# Patient Record
(demographics unavailable — no encounter records)

---

## 2024-11-11 NOTE — HISTORY OF PRESENT ILLNESS
[FreeTextEntry1] : Mr. Ogden is a Male with a PMHx gout (dx 20016, reportedly by arthrocentesis; on colchicine and uloric), asthma, HTN, CAD, SORAYA, DM2, Afib, HLD, increased BMI, PVD who presents for follow up.   Last seen by Dr. Kellie Ac) at [facility] in 2021  Mr. Ogden is here for multiple reasons   INTERVAL HISTORY  has had some joint pain - feels like it is the gout - had some stress - the patient states his mother-in-law passed away last week at age 92 - he is currently ambulating with the use of a cane - there is pain in the patients left foot and ankle - he noticed there is pain and swelling in the right knee  ------------------------------------------------------  BACKGROUND / PRESENTATION  In terms of gout - dx 2016 with years of podagra, hyperuremia, metabolic syndrome and crystal confirmation in 2016.   DECT positive in the past as well. Allopurinol up to 400 mg without reaching therapeutic target despite medical compliance.  Uloric started with poor adherence and loss to f/u. Hasn't been on it for years and doesn't remember why.    Has been taking colchicine almost daily at this point for his joint symptoms - and a bit more with gout flares - which he continues to have often, the last one being last week.   Experiences pain and swelling in the ankles/toes/knees.  Associated with heat and redness.  took some extra colchicine and feels better this week   peak sUA 2017 9.4   CARE TEAM Dr. shields is PCP  Dr. Zacarias is Pulmonary Dr. Carranza - cardiology Dr. Green psychologist at [facility] (for his depression) had gout in the big toe - then the ankle and then the knee - feels better from that

## 2024-11-11 NOTE — ASSESSMENT
[FreeTextEntry1] : Mr. Ogden is a male with PMHx gout (dx 20016, reportedly by arthrocentesis; on colchicine and uloric), asthma, HTN, CAD, SORAYA, DM2, Afib, HLD, increased BMI, PVD who presents for follow up.   PCP = Dr. Surya Hull - [phone]  # gout  dx 2016 with years of podagra, hyperuricemia (peak sUA 2017 9.4), metabolic syndrome and crystal confirmation in 2016.   Dual energy CT showed gout crystals in left popliteal tendon. Allopurinol up to 400 mg without reaching therapeutic target despite medical compliance.  Uloric started with poor adherence and loss to f/u. Hasn't been on it for years and doesn't remember why.   -- check inflammatory markers and sUA - T2T - didn't do them yet -- continue colchicine at 3x week (on reduced dose 2/2 ranolazine) -- continue allopurinol at 100 mg - at target   # OA knees, tricompartmental -- tylenol prn  -- t/c steroid injection - declines for now  # CINDY positive  -- check TSH now and then annually with PCP  # medication monitoring  -- had increased pain uloric when didnt take the colhccine  -- continue colchicine  # barriers patient has difficulties with travel  -- also shared with patient Dr. Giraldo in [facility] medical care services serve as the continuing focal point for needed health care services that are part of ongoing care related to a patient's single, serious condition or a complex condition.   More than 50% of the encounter was spent counseling the patient on differential, workup, disease course and treatment/management. Education was provided to the patient during this encounter. All questions and concerns were addressed and answered. The patient verbalized understanding and agreed to the plan.   Patient has been instructed to call for an appointment if new symptoms develop. Patient has been instructed to make a follow-up appointment in 1 months   Time spent on the encounter included, but is not limited to, preparing to see the patient, obtaining and/or reviewing separately obtained history, performing the evaluation, counseling and educating, independently interpreting results with communication to patient, order placement, referring and/or communicating with other health professionals as described, and documenting clinical information in the electronic health record.

## 2024-12-04 NOTE — REVIEW OF SYSTEMS
[Fatigue] : fatigue [Cough] : cough [Fever] : no fever [Chills] : no chills [Chest Pain] : no chest pain [Lower Ext Edema] : no lower extremity edema [Shortness Of Breath] : no shortness of breath

## 2024-12-04 NOTE — ASSESSMENT
[FreeTextEntry1] : 81M with a history of hypertension, atrial fibrillation (Eliquis), type 2 diabetes, CAD (s/p PCIx3 May2022), SORAYA (not on CPAP), gout, and BPH presents for cough and follow up of chronic conditions.  RTC 10 weeks for f/u chronic conditions and TTE Case Discussed with Dr. Itzel Weiner MD EM PGY-4

## 2024-12-04 NOTE — HISTORY OF PRESENT ILLNESS
[de-identified] : 81M with a history of hypertension, atrial fibrillation (Eliquis), type 2 diabetes, CAD (s/p PCIx3 May2022), SORAYA (not on CPAP), gout, and BPH presents for follow up and 2 weeks of cough.   Cough: Pt reports 2 weeks ago he had worsening of congestion, cough, and fevers. Was very weak at that time. Fever and weakness have improved but cough remains and he does not feel rowena kto normal. in 9/2024 received COVID booster, Prevnar 20, RSV, Flu vaccine.  Pt also requests podiatry referral for help with his toenails, help increasing HHA hours, refill on adult diapers through home care agency, and to be considered to restart on Ozempic as he feels he has regained much weight in the last year since stopping the medication.

## 2024-12-04 NOTE — HISTORY OF PRESENT ILLNESS
[de-identified] : 81M with a history of hypertension, atrial fibrillation (Eliquis), type 2 diabetes, CAD (s/p PCIx3 May2022), SORAYA (not on CPAP), gout, and BPH presents for follow up and 2 weeks of cough.   Cough: Pt reports 2 weeks ago he had worsening of congestion, cough, and fevers. Was very weak at that time. Fever and weakness have improved but cough remains and he does not feel rowena kto normal. in 9/2024 received COVID booster, Prevnar 20, RSV, Flu vaccine.  Pt also requests podiatry referral for help with his toenails, help increasing HHA hours, refill on adult diapers through home care agency, and to be considered to restart on Ozempic as he feels he has regained much weight in the last year since stopping the medication.

## 2025-02-12 NOTE — HISTORY OF PRESENT ILLNESS
[de-identified] : 81M with a history of hypertension, atrial fibrillation (Eliquis), type 2 diabetes, CAD (s/p PCIx3 May2022), SORAYA (not on CPAP), gout, and BPH presents for follow up.  Chest Pains: occur in the AM associated with deep breathing exercises and bilateral shoulder stretches which also trigger pain. Non exertional, no pedal edema, no radiation of the pain. Saw Cardiologist Dr. Carranza, already scheduled for TTE and stress test.  B/l knee pain, lower back pain, R shoulder pain: Most painful in the morning, he feels stiff and like it takes time to get going. R shoulder worse with >90 degree abduction. back pain improves with walking but is worst when standing up from a chair. Does not feel he has acute gout pain. No fever, no weight loss, no small joint hand or foot or wrist or elbow pain.   Hematuria: intermittent hematuria with intermittent burning with urination, has occasional nonspecific abdominal pains, uncertain if they're related to the urinary symptoms. No fever, systemic symptoms, lightheadedness, bleeding from nose or rectum. Had cystoscopy many years ago without signs of mass.

## 2025-02-12 NOTE — REVIEW OF SYSTEMS
[Chest Pain] : chest pain [Abdominal Pain] : abdominal pain [Dysuria] : dysuria [Nocturia] : nocturia [Hematuria] : hematuria [Joint Pain] : joint pain [Back Pain] : back pain [Joint Swelling] : joint swelling [Fever] : no fever [Recent Change In Weight] : ~T no recent weight change [Palpitations] : no palpitations [Lower Ext Edema] : no lower extremity edema [Shortness Of Breath] : no shortness of breath [Dyspnea on Exertion] : not dyspnea on exertion [Incontinence] : no incontinence [Frequency] : no frequency

## 2025-02-12 NOTE — ASSESSMENT
[FreeTextEntry1] : 81M with a history of hypertension, atrial fibrillation (Eliquis), type 2 diabetes, CAD (s/p PCIx3 May2022), SORAYA (not on CPAP), gout, and BPH presents for follow up.  RTC 1 month f/u chronic issues Case Discussed with Dr. Chanda Weiner MD EM PGY-4

## 2025-02-12 NOTE — PHYSICAL EXAM
[Normal] : no acute distress, well nourished, well developed and well-appearing [No Respiratory Distress] : no respiratory distress  [No Accessory Muscle Use] : no accessory muscle use [No Edema] : there was no peripheral edema [No Spinal Tenderness] : no spinal tenderness [Coordination Grossly Intact] : coordination grossly intact [Speech Grossly Normal] : speech grossly normal [Memory Grossly Normal] : memory grossly normal [Normal Mood] : the mood was normal [de-identified] : L leg mildly tender varicosity to medial shin without erythema or rash. [de-identified] : limited ROm to the lumbar spine and back with tense firm musculature throughout, no spinous process tenderness. no warmth/redness to knee or hsoulder joints bilaterally. R shoulder with pain with passive and active ROM with abduction >90 degrees. No chest wall tenderness to the L chest.

## 2025-02-12 NOTE — PHYSICAL EXAM
[Normal] : no acute distress, well nourished, well developed and well-appearing [No Respiratory Distress] : no respiratory distress  [No Accessory Muscle Use] : no accessory muscle use [No Edema] : there was no peripheral edema [No Spinal Tenderness] : no spinal tenderness [Coordination Grossly Intact] : coordination grossly intact [Speech Grossly Normal] : speech grossly normal [Memory Grossly Normal] : memory grossly normal [Normal Mood] : the mood was normal [de-identified] : L leg mildly tender varicosity to medial shin without erythema or rash. [de-identified] : limited ROm to the lumbar spine and back with tense firm musculature throughout, no spinous process tenderness. no warmth/redness to knee or hsoulder joints bilaterally. R shoulder with pain with passive and active ROM with abduction >90 degrees. No chest wall tenderness to the L chest.

## 2025-02-12 NOTE — HISTORY OF PRESENT ILLNESS
[de-identified] : 81M with a history of hypertension, atrial fibrillation (Eliquis), type 2 diabetes, CAD (s/p PCIx3 May2022), SORAYA (not on CPAP), gout, and BPH presents for follow up.  Chest Pains: occur in the AM associated with deep breathing exercises and bilateral shoulder stretches which also trigger pain. Non exertional, no pedal edema, no radiation of the pain. Saw Cardiologist Dr. Carranza, already scheduled for TTE and stress test.  B/l knee pain, lower back pain, R shoulder pain: Most painful in the morning, he feels stiff and like it takes time to get going. R shoulder worse with >90 degree abduction. back pain improves with walking but is worst when standing up from a chair. Does not feel he has acute gout pain. No fever, no weight loss, no small joint hand or foot or wrist or elbow pain.   Hematuria: intermittent hematuria with intermittent burning with urination, has occasional nonspecific abdominal pains, uncertain if they're related to the urinary symptoms. No fever, systemic symptoms, lightheadedness, bleeding from nose or rectum. Had cystoscopy many years ago without signs of mass.

## 2025-02-20 NOTE — PHYSICAL EXAM
[No Acute Distress] : no acute distress [Normal Appearance] : normal appearance [Normal Rate/Rhythm] : normal rate/rhythm [Normal S1, S2] : normal s1, s2 [No Resp Distress] : no resp distress [Clear to Auscultation Bilaterally] : clear to auscultation bilaterally [No Edema] : no edema [TextBox_2] : Obese [TextBox_99] : Uses cane

## 2025-02-20 NOTE — REVIEW OF SYSTEMS
[Fatigue] : fatigue [SOB on Exertion] : sob on exertion [Arthralgias] : arthralgias [Back Pain] : back pain [Diabetes] : diabetes [Negative] : Hematologic [TextBox_44] : As in history of present illness

## 2025-02-20 NOTE — DISCUSSION/SUMMARY
[FreeTextEntry1] : Currently a great deal of his dyspnea is related to deconditioning.  He has difficulty walking and he is using a cane.  He clearly has weakness in both lower extremities particularly his quadriceps.  I asked him to follow-up with Dr. Sanders because of his right shoulder pain and I also gave him the name of a orthopedic surgeon.  I renewed his Breo and his nasal sprays.  I gave him a copy of his letter appealing the decision for reducing the hours of his aide

## 2025-02-20 NOTE — HISTORY OF PRESENT ILLNESS
[TextBox_4] : 82-year-old male complaining of dyspnea. The patient has a history of hypertension, diabetes, coronary artery disease, sleep apnea but not on treatment, gout and depression. He has had recent coronary stent placements. He is a lifelong non-smoker with no occupational exposures. Prior lung functions have demonstrated a mild to moderate restrictive ventilatory impairment. He had a significant multifocal pneumonia [id number] from which he has completely recovered.   He was last seen in 8/2024. At the time c/o dyspnea on minimal to moderate exertion with discomfort in both legs. Vascular evaluation of both lower extremities has been unremarkable. We have previously prescribed budesonide/formoterol as an inhaler but insurance coverage resulted in a change to Breo Ellipta. We also added nasal sprays.  He reports that he is using inhalers daily.  He clearly is quite deconditioned.  He had difficulty getting out of a chair and he is morbidly obese.  He is complaining today more about his right shoulder and shortness of breath.

## 2025-05-31 NOTE — ASSESSMENT
[FreeTextEntry1] : #HCM Lipids, CBC, CMP, Mg today, f/u Address HCM issues at next follow up  Case discussed with Dr. Avelino Garrett, PGY1

## 2025-05-31 NOTE — PHYSICAL EXAM
[No Acute Distress] : no acute distress [Well Nourished] : well nourished [Well Developed] : well developed [Normal Sclera/Conjunctiva] : normal sclera/conjunctiva [No Respiratory Distress] : no respiratory distress  [No Accessory Muscle Use] : no accessory muscle use [Clear to Auscultation] : lungs were clear to auscultation bilaterally [Normal Rate] : normal rate  [Regular Rhythm] : with a regular rhythm [Normal S1, S2] : normal S1 and S2 [No Murmur] : no murmur heard [No Extremity Clubbing/Cyanosis] : no extremity clubbing/cyanosis [de-identified] : some tearing of the eyes b/l, no injection or other discharge [de-identified] : 1+ lower extremity edema [de-identified] : lower extremity weakness, difficulty with standing up [de-identified] : dark discoloration in inguinal area consistent with tinea cruris

## 2025-05-31 NOTE — REVIEW OF SYSTEMS
[Chest Pain] : chest pain [Lower Ext Edema] : lower extremity edema [Dyspnea on Exertion] : dyspnea on exertion [Dysuria] : dysuria [Incontinence] : incontinence [Joint Pain] : joint pain [Back Pain] : back pain [Unsteady Walk] : ataxia [Memory Loss] : memory loss [Fever] : no fever [Chills] : no chills [Shortness Of Breath] : no shortness of breath [Abdominal Pain] : no abdominal pain [Nausea] : no nausea [Vomiting] : no vomiting

## 2025-05-31 NOTE — HISTORY OF PRESENT ILLNESS
[FreeTextEntry1] : f/u [de-identified] : 81 y/o M with hx of htn, afib on elliquis, T2D, CAD s/p PCI x 3 may 2022, SORAYA not on CPAP, gout and BPH presents for follow up on multiple chronic issues.   HTN: BPs at home are around 140. Pt reports he's only taking 1 losartan 25 mg tablet, though prescription is written as 2 tablets daily. Counseled pt on taking 2 tablets daily. Otherwise he is taking all medications as prescribed.   Low back/groin pain: ongoing for 2 years, feels like it got worse since the cardiac stent placement in 2023, during which the cardiologist went in through the groin for access. Pt asking for imaging. Has not been going to PT.  Weight gain: pt states he hasn't lost weight on Ozempic and wants to switch to monjaro. Pt amenable to titrate Ozempic to 2 mg first and see if that helps. Also counseled on healthy diet and going to PT.    Health aid: pt currently has aid for 7 hours/day M-T, but they recently cut back aid on Fridays to only 3 hours, pt asking for help to get aid for full 7 hours to help him prepare for weekend when there is no aid for two days.   Cardiac: Pt reports he has some chest pain and SOB with exertion and at rest intermittently, though this is his baseline. Counseled to d/c ranolazine given contraindication with colchicine, which pt is taking daily. Pt also taking isosorbide for his angina and states this works well.   Urinary incontinence: pt reports worsening recently, states he's having 3-4 daily episodes of urinary incontinence. Amenable to urology referral.

## 2025-05-31 NOTE — HISTORY OF PRESENT ILLNESS
[FreeTextEntry1] : f/u [de-identified] : 81 y/o M with hx of htn, afib on elliquis, T2D, CAD s/p PCI x 3 may 2022, SORAYA not on CPAP, gout and BPH presents for follow up on multiple chronic issues.   HTN: BPs at home are around 140. Pt reports he's only taking 1 losartan 25 mg tablet, though prescription is written as 2 tablets daily. Counseled pt on taking 2 tablets daily. Otherwise he is taking all medications as prescribed.   Low back/groin pain: ongoing for 2 years, feels like it got worse since the cardiac stent placement in 2023, during which the cardiologist went in through the groin for access. Pt asking for imaging. Has not been going to PT.  Weight gain: pt states he hasn't lost weight on Ozempic and wants to switch to monjaro. Pt amenable to titrate Ozempic to 2 mg first and see if that helps. Also counseled on healthy diet and going to PT.    Health aid: pt currently has aid for 7 hours/day M-T, but they recently cut back aid on Fridays to only 3 hours, pt asking for help to get aid for full 7 hours to help him prepare for weekend when there is no aid for two days.   Cardiac: Pt reports he has some chest pain and SOB with exertion and at rest intermittently, though this is his baseline. Counseled to d/c ranolazine given contraindication with colchicine, which pt is taking daily. Pt also taking isosorbide for his angina and states this works well.   Urinary incontinence: pt reports worsening recently, states he's having 3-4 daily episodes of urinary incontinence. Amenable to urology referral.

## 2025-05-31 NOTE — END OF VISIT
[] : Resident [FreeTextEntry3] : Pt here for f/u. Has alot of longstanding similar issues from previous visits. C/o LBP, bilat groin pain, weight gain, nocturia x 3-4, needs more HHA hours. On additional questioning, pt gets CP daily, only when he walks too fast and is rushing, although this seems to be everyday, and then he takes an isordil prn. Lives alone, eats Chinese food, other take out, one big meal/day, avoids meat and protein due to his gout, says he does he alot of vegetables. On exam, pt with cane, extreme difficulty just getting up from chair, and needs assistance to get onto exam table. Large abdominal pannus. Midline lower palpable back pain. Markedly decreased ROM of back in all directions. Bilat tenderness along inguinal ligaments, no masses or lesions found. Extensive hyperpigmented rash under scrotum involving perineum and upper thighs bilat, pt wearing a diaper and several layers of clothes.  Plan: 1. daily nitrates; 2. daily colchicine; 3,  referral; 4. PT referral; 5. discussed dietary changes/weight loss/increase Ozempic dose;, 6. adjust BP meds; 7. treatment for likely tinea corporis although with body habitus and pt wearing diapers unlikely to elimiate long-term; 8. concern for pt living alone with multiple co-morbidities, unclear how he can care for himself, SW to talk with pt today.

## 2025-05-31 NOTE — PHYSICAL EXAM
[No Acute Distress] : no acute distress [Well Nourished] : well nourished [Well Developed] : well developed [Normal Sclera/Conjunctiva] : normal sclera/conjunctiva [No Respiratory Distress] : no respiratory distress  [No Accessory Muscle Use] : no accessory muscle use [Clear to Auscultation] : lungs were clear to auscultation bilaterally [Normal Rate] : normal rate  [Regular Rhythm] : with a regular rhythm [Normal S1, S2] : normal S1 and S2 [No Murmur] : no murmur heard [No Extremity Clubbing/Cyanosis] : no extremity clubbing/cyanosis [de-identified] : some tearing of the eyes b/l, no injection or other discharge [de-identified] : 1+ lower extremity edema [de-identified] : lower extremity weakness, difficulty with standing up [de-identified] : dark discoloration in inguinal area consistent with tinea cruris

## 2025-07-16 NOTE — HISTORY OF PRESENT ILLNESS
[___ Day(s) Ago] : [unfilled] day(s) ago [FreeTextEntry1] : Mr. Ogden is a Male with a PMHx gout (dx 20016, reportedly by arthrocentesis; on colchicine and uloric), asthma, HTN, CAD, SORAYA, DM2, Afib, HLD, increased BMI, PVD who presents for follow up.   Last seen by Dr. Kellie Ac) at [facility] in 2021  Mr. Ogden is here for multiple reasons   INTERVAL HISTORY  has had some joint pain - feels like it is the gout - had some stress - the patient states his mother-in-law passed away last week at age 92 - he is currently ambulating with the use of a cane - there is pain in the patients left foot and ankle - he noticed there is pain and swelling in the right knee  ------------------------------------------------------  BACKGROUND / PRESENTATION  In terms of gout - dx 2016 with years of podagra, hyperuremia, metabolic syndrome and crystal confirmation in 2016.   DECT positive in the past as well. Allopurinol up to 400 mg without reaching therapeutic target despite medical compliance.  Uloric started with poor adherence and loss to f/u. Hasn't been on it for years and doesn't remember why.    Has been taking colchicine almost daily at this point for his joint symptoms - and a bit more with gout flares - which he continues to have often, the last one being last week.   Experiences pain and swelling in the ankles/toes/knees.  Associated with heat and redness.  took some extra colchicine and feels better this week   peak sUA 2017 9.4   CARE TEAM Dr. shields is PCP  Dr. Zacarias is Pulmonary Dr. Carranza - cardiology Dr. Green psychologist at [facility] (for his depression) had gout in the big toe - then the ankle and then the knee - feels better from that

## 2025-07-16 NOTE — ASSESSMENT
[FreeTextEntry1] : Mr. Ogden is a male with PMHx gout (dx 20016, reportedly by arthrocentesis; on colchicine and uloric), asthma, HTN, CAD, SORAYA, DM2, Afib, HLD, increased BMI, PVD who presents for follow up.   PCP = Dr. Surya Hull - [phone]  # gout  dx 2016 with years of podagra, hyperuricemia (peak sUA 2017 9.4), metabolic syndrome and crystal confirmation in 2016.   Dual energy CT showed gout crystals in left popliteal tendon. Allopurinol up to 400 mg without reaching therapeutic target despite medical compliance.  Uloric started with poor adherence and loss to f/u. Hasn't been on it for years and doesn't remember why.   -- check inflammatory markers and sUA - T2T - didn't do them yet -- continue colchicine at 3x week (on reduced dose 2/2 ranolazine) -- continue allopurinol at 100 mg - at target   # OA knees, tricompartmental -- tylenol prn  -- t/c steroid injection - declines for now  # CINDY positive  -- check TSH now and then annually with PCP  # medication monitoring  -- had increased pain uloric when didnt take the colhccine  -- continue colchicine  # barriers patient has difficulties with travel  -- also shared with patient Dr. Giraldo in [facility] medical care services serve as the continuing focal point for needed health care services that are part of ongoing care related to a patient's single, serious condition or a complex condition.   More than 50% of the encounter was spent counseling the patient on differential, workup, disease course and treatment/management. Education was provided to the patient during this encounter. All questions and concerns were addressed and answered. The patient verbalized understanding and agreed to the plan.   Patient has been instructed to call for an appointment if new symptoms develop. Patient has been instructed to make a follow-up appointment in 1 months   Time spent on the encounter included, but is not limited to, preparing to see the patient, obtaining and/or reviewing separately obtained history, performing the evaluation, counseling and educating, independently interpreting results with communication to patient, order placement, referring and/or communicating with other health professionals as described, and documenting clinical information in the electronic health record.

## 2025-07-17 NOTE — REASON FOR VISIT
[Symptom and Test Evaluation] : symptom and test evaluation [CV Risk Factors and Non-Cardiac Disease] : CV risk factors and non-cardiac disease [FreeTextEntry1] : Dear Ion:  I had the pleasure of re-evaluating Mr. Ogden for f/u cardiovascular and vascular medicine consultation.  I last saw him in July 2024.  PMH: This is an 81 year-old-man with history of HTN, DM2, CAD, SORAYA (not on CPAP), gout, BPH, and depression.  He is status post PCI x 2 times over 2 days with Dr. Lucia Welch ([place]). 5/31 -- first cath for  6/16 -- mLAD intervention   Since his last visit, he now complains of: 1. Progressively worsening exertional dyspnea.  His last cardiac assessment was in March 2023 via an echocardiogram that noted normal biventricular systolic function.  He had last undergone LHC in May 2022 at [place]. 2. Stable bilateral calf claudication.  His last ARTI/PVR was in October 2022 which was normal bilaterally.  He did report previously to his son (an interventional cardiologist in the [place]) that he had bilateral leg discomfort (non-exertional). He thought it might have been attributed to statin-induced myopathy as he was taking rosuvastatin 20 qhs at that time. He had stopped taking rosuvastatin for a month with improvement in symptoms. He has since resumed taking rosuvastatin 5 mg + Zetia 10 + coenzyme q10. He has no new leg complaints since then.  Recommendations/plan: 1. Continue current cardiac medications. 2. Will arrange for TTE and pharmacologic NST (he cannot ambulate). 3. F/U after testing to review results and updated recommendations. If stable. f/u in the office in six months. 4. He is also asking to maintain HHA for 8 hours/day.  Based on his comorbid conditions and cardiovascular symptoms, it is reasonable that he keep a HHA for 8 hrs/day.   All questions answered.

## 2025-07-17 NOTE — REVIEW OF SYSTEMS
[SOB] : no shortness of breath [Dyspnea on exertion] : dyspnea during exertion [Chest Discomfort] : no chest discomfort [Leg Claudication] : no intermittent leg claudication [Myalgia] : myalgia [Dizziness] : dizziness [Negative] : Heme/Lymph